# Patient Record
Sex: MALE | Race: WHITE | NOT HISPANIC OR LATINO | URBAN - METROPOLITAN AREA
[De-identification: names, ages, dates, MRNs, and addresses within clinical notes are randomized per-mention and may not be internally consistent; named-entity substitution may affect disease eponyms.]

---

## 2019-12-24 ENCOUNTER — EMERGENCY (EMERGENCY)
Facility: HOSPITAL | Age: 64
LOS: 1 days | Discharge: HOME | End: 2019-12-24
Attending: EMERGENCY MEDICINE
Payer: COMMERCIAL

## 2019-12-24 VITALS
TEMPERATURE: 98 F | WEIGHT: 199.96 LBS | HEART RATE: 98 BPM | RESPIRATION RATE: 16 BRPM | DIASTOLIC BLOOD PRESSURE: 93 MMHG | HEIGHT: 70 IN | SYSTOLIC BLOOD PRESSURE: 170 MMHG | OXYGEN SATURATION: 98 %

## 2019-12-24 PROCEDURE — 73564 X-RAY EXAM KNEE 4 OR MORE: CPT

## 2019-12-24 PROCEDURE — 99283 EMERGENCY DEPT VISIT LOW MDM: CPT

## 2019-12-24 PROCEDURE — 73564 X-RAY EXAM KNEE 4 OR MORE: CPT | Mod: 26,LT

## 2019-12-24 RX ORDER — ACETAMINOPHEN 500 MG
650 TABLET ORAL ONCE
Refills: 0 | Status: COMPLETED | OUTPATIENT
Start: 2019-12-24 | End: 2019-12-24

## 2019-12-24 RX ORDER — IBUPROFEN 200 MG
600 TABLET ORAL ONCE
Refills: 0 | Status: COMPLETED | OUTPATIENT
Start: 2019-12-24 | End: 2019-12-24

## 2019-12-24 RX ADMIN — Medication 600 MILLIGRAM(S): at 20:33

## 2019-12-24 RX ADMIN — Medication 650 MILLIGRAM(S): at 20:33

## 2019-12-24 NOTE — ED PROVIDER NOTE - PHYSICAL EXAMINATION
GENERAL: no acute distress, non-toxic appearing, AAOx3  HEAD: normocephalic, atraumatic  HEENT: normal conjunctiva, oral mucosa moist, neck supple  CARDIAC: regular rate and rhythm, normal S1 and S2, no appreciable murmurs  PULM: normal breath sounds, clear to ascultation bilaterally, no rales, rhonchi, or wheezing  GI: abdomen nondistended, soft, nontender, no guarding or rebound tenderness  : no CVA tenderness b/l, no suprapubic tenderness  NEURO: no focal motor or sensory deficits, normal gait  MSK: negative Anterior drawer test, no swelling around knee, FROM in knees, no tenderness over ligaments  SKIN: well-perfused, extremities warm, no visible rashes  PSYCH: appropriate mood and affect

## 2019-12-24 NOTE — ED PROVIDER NOTE - NSFOLLOWUPINSTRUCTIONS_ED_ALL_ED_FT
Knee pain    -Please follow up with your Primary Care Doctor    SEEK IMMEDIATE MEDICAL CARE IF YOU HAVE ANY OF THE FOLLOWING SYMPTOMS: numbness, tingling, increasing pain, or weakness in any part of the injured limb.

## 2019-12-24 NOTE — ED PROVIDER NOTE - ATTENDING CONTRIBUTION TO CARE
65y/o M h/o BPH p/w L knee pain for one month now worse after coming off the bus today.  pt took tylenol earlier for pain, with nl knee exam, walking, plain films ordered, analgesia.

## 2019-12-24 NOTE — ED PROVIDER NOTE - NSFOLLOWUPCLINICS_GEN_ALL_ED_FT
[facility]  Sports Medicine  [address] [place] [address]  Phone: [phone]  Fax:   Follow Up Time: Routine

## 2019-12-24 NOTE — ED PROVIDER NOTE - OBJECTIVE STATEMENT
65y/o M h/o BPH p/w L knee pain. Pt states pain has been ongoing for 1 mo, visited PMD who ordered doppler and r/o DVT. Pt has been applying heat and icy hot and has improved pain. Pt works at [employer] and stands most of the day. Pt here today because he walked off bus and his knee buckled. Denies trauma, fevers, chills, difficulty ambulating, numbness, tingling, swelling.

## 2019-12-24 NOTE — ED ADULT TRIAGE NOTE - CHIEF COMPLAINT QUOTE
L posterior knee pain, feels "tight," denies any recent falls/injuries/trauma  Got off bus and felt that L knee "gave out," did not fall

## 2019-12-24 NOTE — ED ADULT NURSE NOTE - OBJECTIVE STATEMENT
63 y/o male PMH BPH presents to ED c/o L knee pain x 1 month. Pt states he was getting off a bus when his L knee "buckled and gave out." Since then has been having posterior knee pain with difficulty ambulating. Went to his PMD who ordered doppler to r/o DVT. Pt has not been taking pain medication but has been using ice and heat packs with some relief. Denies injury to area, n/v/d, SOB, chest pain, paresthesias. Able to ambulate though with discomfort to area. No swelling or signs of injury noted.

## 2019-12-24 NOTE — ED PROVIDER NOTE - NS ED ROS FT
GENERAL: denies fever, chills  HEENT: denies headaches, lightheadedness, dizziness  CARDIAC: denies chest pain, palpitations  PULM: denies SOB, cough  GI: denies nausea, vomiting  : denies dysuria, frequency, incontinence, hematuria  NEURO: denies motor weakness, sensory changes  MSK: +L knee pain; denies muscle pain  SKIN: denies lacerations, new rashes, hives  HEME: denies active bleeding, bruising

## 2019-12-24 NOTE — ED PROVIDER NOTE - CLINICAL SUMMARY MEDICAL DECISION MAKING FREE TEXT BOX
63y/o M h/o BPH p/w L knee pain for 1 mo worsened when buckling knee. Negative tests and FROM. Will x-ray knee and give tylenol/ibuprofen. reassess and d/c home with ortho f/u.

## 2019-12-24 NOTE — ED PROVIDER NOTE - PATIENT PORTAL LINK FT
You can access the FollowMyHealth Patient Portal offered by [facility] by registering at the following website: [email or web address]. By joining [employer]’s FollowMyHealth portal, you will also be able to view your health information using other applications (apps) compatible with our system.

## 2019-12-24 NOTE — ED PROVIDER NOTE - PROGRESS NOTE DETAILS
Patient re-evaluated at bedside and is now currently feeling better after treatment.  -Shabbir Sexton, PGY-1

## 2019-12-24 NOTE — ED PROVIDER NOTE - NS_EDPROVIDERDISPOUSERTYPE_ED_A_ED
Attending Attestation (For Attendings USE Only).../Medical Students Attestation (For Medical Student USE Only)...

## 2019-12-26 PROBLEM — N40.0 BENIGN PROSTATIC HYPERPLASIA WITHOUT LOWER URINARY TRACT SYMPTOMS: Chronic | Status: ACTIVE | Noted: 2019-12-24

## 2019-12-26 PROBLEM — Z00.00 ENCOUNTER FOR PREVENTIVE HEALTH EXAMINATION: Status: ACTIVE | Noted: 2019-12-26

## 2019-12-30 DIAGNOSIS — M25.562 PAIN IN LEFT KNEE: ICD-10-CM

## 2020-01-10 ENCOUNTER — APPOINTMENT (OUTPATIENT)
Dept: ORTHOPEDIC SURGERY | Facility: CLINIC | Age: 65
End: 2020-01-10
Payer: COMMERCIAL

## 2020-01-10 VITALS — WEIGHT: 205 LBS | HEIGHT: 70 IN | BODY MASS INDEX: 29.35 KG/M2

## 2020-01-10 DIAGNOSIS — Z78.9 OTHER SPECIFIED HEALTH STATUS: ICD-10-CM

## 2020-01-10 PROCEDURE — 99202 OFFICE O/P NEW SF 15 MIN: CPT

## 2020-01-10 RX ORDER — BIMATOPROST 0.1 MG/ML
SOLUTION/ DROPS OPHTHALMIC
Refills: 0 | Status: ACTIVE | COMMUNITY

## 2020-01-10 RX ORDER — TAMSULOSIN HYDROCHLORIDE 0.4 MG/1
CAPSULE ORAL
Refills: 0 | Status: ACTIVE | COMMUNITY

## 2020-01-10 RX ORDER — FINASTERIDE 1 MG/1
TABLET ORAL
Refills: 0 | Status: ACTIVE | COMMUNITY

## 2020-01-10 RX ORDER — ASPIRIN 81 MG
TABLET,CHEWABLE ORAL
Refills: 0 | Status: ACTIVE | COMMUNITY

## 2020-01-10 NOTE — DISCUSSION/SUMMARY
[de-identified] : Discussed at length the nature of the patients condition. Their left knee symptoms appear secondary to may have been due to mild chondromalacia or a muscle strain. His symptoms have essentially resolved as his xrays are normal. They can continue activities as tolerated. He may use a knee sleeve for work. If he has a recurrence of symptoms he may return for evaluation at which point we may consider further imaging studies.

## 2020-01-10 NOTE — HISTORY OF PRESENT ILLNESS
[de-identified] : 65 year old male presents for evaluation of his left knee. Patient denies any specific injury. Patient reports on [facility] Puentes he had a buckling episode that resulted in lateral left knee pain which was dull in nature. Patient went to the ER 12/24/2019 where xrays were negative for fracture.  He has not had another episode of buckling and but denies any other mechanical symptoms or swelling. His pain at this point has completely subsided. He uses tylenol and motrin prn and wears a compressive sleeve as he stands for long hours at work. He also uses icy hot prn. He also had a venous doppler last month for unrelated calf pain which was negative for DVT. He is here today for a final check.

## 2020-01-10 NOTE — ADDENDUM
[FreeTextEntry1] : This note was written by Loraine Lopez on 01/10/2020 acting as scribe for Dr. Quinn Cervantes M.D.\par \par I, Dr. Quinn Cervantes M.D., have read and attest that all the information, medical decision making and discharge instructions within are true and accurate.

## 2020-01-10 NOTE — PHYSICAL EXAM
[de-identified] : Right knee xray films brought in by patient dated 12/24/2019. I agree with the radiologist findings consistent with normal alignment, joint space narrowing, patella sits at an appropriate height in a central position with slight joint space narrowing, no fractures seen.  [de-identified] : General appearance: well nourished and hydrated, pleasant, alert and oriented x 3, cooperative.\par HEENT: Normocephalic, EOM intact, Nasal septum midline, Oral cavity clear, External auditory canal clear.\par Cardiovascular: no apparent abnormalities, mild lower leg edema, no varicosities, pedal pulses are palpable.\par Lymphatics Lymph nodes: none palpated, Lymphedema: not present.\par Neurologic: sensation is normal, no muscle weakness in upper or lower extremities, patella tendon reflexes intact .\par Dermatologic no apparent skin lesions, moist, warm, no rash.\par Spine: cervical spine appears normal and moves freely, thoracic spine appears normal and moves freely, lumbosacral spine appears normal and moves freely.\par Gait: nonantalgic.\par \par Left knee\par Inspection: no effusion or erythema.\par Wounds: none.\par Alignment: normal.\par Palpation: no specific tenderness on palpation.\par ROM active (in degrees): 0-145\par Ligamentous laxity: all ligaments appear stable,, negative ant. drawer test, negative post. drawer test, stable to varus stress test, stable to valgus stress test. negative Lachman's test, negative pivot shift test\par Meniscal Test: negative McMurrays, negative Magdalena.\par Patellofemoral Alignment Test: Q angle-, normal.\par Muscle Test: good quad strength.\par Leg examination: calf is soft and non-tender.\par tight hamstring, popliteal angle 45 degrees, positive Ady test, tight IT band \par \par Right knee\par Inspection: no effusion or erythema.\par Wounds: none.\par Alignment: normal.\par Palpation: no specific tenderness on palpation.\par ROM active (in degrees): 0-145\par Ligamentous laxity: all ligaments appear stable,, negative ant. drawer test, negative post. drawer test, stable to varus stress test, stable to valgus stress test. negative Lachman's test, negative pivot shift test\par Meniscal Test: negative McMurrays, negative Magdalena.\par Patellofemoral Alignment Test: Q angle-, normal.\par Muscle Test: good quad strength.\par Leg examination: calf is soft and non-tender.\par tight hamstring, popliteal angle 45 degrees, positive Ady test, tight IT band \par \par Left hip\par Inspection: No swelling or ecchymosis.\par Wounds: none.\par Palpation: non-tender.\par Stability: no instability.\par Strength: 5/5 all motor groups.\par ROM: no pain with FROM.\par Leg length: equal.\par \par Right hip\par Inspection: No swelling or ecchymosis.\par Wounds: none.\par Palpation: non-tender.\par Stability: no instability.\par Strength: 5/5 all motor groups.\par ROM: no pain with FROM.\par Leg length: equal.\par \par Left ankle\par Inspection: no erythema noted, no swelling noted.\par Palpation: no pain on palpation .\par ROM: FROM without crepitus.\par Muscle strength: 5/5.\par Stability: no instability noted.\par \par Right ankle\par Inspection: no erythema noted, no swelling noted.\par ROM: FROM without crepitus.\par Palpation: no pain on palpation .\par Muscle strength: 5/5.\par Stability: no instability noted.\par \par Left foot\par Inspection: color, texture and turgor are normal.\par ROM: full range of motion of all joints without pain or crepitus.\par Palpation: no tenderness.\par Stability: no instability noted.\par \par Right foot\par Inspection: color, texture and turgor are normal.\par ROM: full range of motion of all joints without pain or crepitus.\par Palpation: no tenderness.\par Stability: no instability noted.

## 2020-02-07 ENCOUNTER — APPOINTMENT (OUTPATIENT)
Dept: ORTHOPEDIC SURGERY | Facility: CLINIC | Age: 65
End: 2020-02-07
Payer: COMMERCIAL

## 2020-02-07 DIAGNOSIS — S86.912A STRAIN OF UNSPECIFIED MUSCLE(S) AND TENDON(S) AT LOWER LEG LEVEL, LEFT LEG, INITIAL ENCOUNTER: ICD-10-CM

## 2020-02-07 DIAGNOSIS — M22.42 CHONDROMALACIA PATELLAE, LEFT KNEE: ICD-10-CM

## 2020-02-07 PROCEDURE — 73564 X-RAY EXAM KNEE 4 OR MORE: CPT | Mod: LT

## 2020-02-07 PROCEDURE — 99213 OFFICE O/P EST LOW 20 MIN: CPT

## 2020-02-07 PROCEDURE — 73560 X-RAY EXAM OF KNEE 1 OR 2: CPT | Mod: RT

## 2020-02-07 NOTE — ADDENDUM
[FreeTextEntry1] : This note was written by Loraine Lopez on 02/07/2020 acting as scribe for Dr. Quinn Cervantes M.D.\par \par I, Dr. Quinn Cervantes M.D., have read and attest that all the information, medical decision making and discharge instructions within are true and accurate.

## 2020-02-07 NOTE — PHYSICAL EXAM
[de-identified] : General: bilateral lower leg edema\par \par Left knee\par Inspection: no effusion or erythema.\par Wounds: none.\par Alignment: normal.\par Palpation: no specific tenderness on palpation.\par ROM active (in degrees): 0-135\par Ligamentous laxity: all ligaments appear stable,, negative ant. drawer test, negative post. drawer test, stable to varus stress test, stable to valgus stress test. negative Lachman's test, negative pivot shift test\par Meniscal Test: negative McMurrays, negative Magdalena.\par Patellofemoral Alignment Test: Q angle-, normal.\par Muscle Test: good quad strength.\par Leg examination: calf is soft and non-tender [de-identified] : Left knee xray merchant view, taken at the office today demonstrates good joint space and a well centered patella. \par \par Right knee xrays, standing AP/Lateral, Merchant, and 45 degree PA standing view, taken at the office today shows normal alignment, good joint space maintained, patella sits at an appropriate height in a central position

## 2020-02-07 NOTE — HISTORY OF PRESENT ILLNESS
[de-identified] : 65 year old male presents for follow up evaluation of left knee pain. He reports he has improved since his last visit 1 month ago. He has been using a soft sleeve brace, but reports his left ankle has had increased swelling with use of the brace. Patient saw his PCP who recommended he see his orthopedist. He feels his focal left knee pain is almost completed resolved.

## 2020-02-07 NOTE — DISCUSSION/SUMMARY
[de-identified] : Discussed at length the nature of the patients condition. Overall his knee symptoms have resolved. It appears to have been a mild left knee sprain with chondromalacia. He may discontinue the knee brace at this time as his knee is structurally sound. I do not think his knee is the source of swelling since it is bilateral. I have recommended he follow up with his PCP as he may need to be placed on a diuretic. He may follow up with his as needed.

## 2024-03-17 NOTE — ED ADULT TRIAGE NOTE - HEIGHT IN FEET
5 Attending with Patient AAOx0, unable to make decisions. Next of kin is son in [facility]. Brother would like to take brother to [place] to pursue rehab.   - s/p PEG  - Family would like patient in [facility], pending authorization